# Patient Record
Sex: FEMALE | Race: WHITE
[De-identification: names, ages, dates, MRNs, and addresses within clinical notes are randomized per-mention and may not be internally consistent; named-entity substitution may affect disease eponyms.]

---

## 2017-05-06 ENCOUNTER — HOSPITAL ENCOUNTER (EMERGENCY)
Dept: HOSPITAL 10 - FTE | Age: 47
Discharge: HOME | End: 2017-05-06
Payer: MEDICAID

## 2017-05-06 VITALS
BODY MASS INDEX: 23.45 KG/M2 | HEIGHT: 62 IN | HEIGHT: 62 IN | WEIGHT: 127.43 LBS | BODY MASS INDEX: 23.45 KG/M2 | WEIGHT: 127.43 LBS

## 2017-05-06 DIAGNOSIS — J02.9: Primary | ICD-10-CM

## 2017-05-06 PROCEDURE — 96372 THER/PROPH/DIAG INJ SC/IM: CPT

## 2017-05-06 NOTE — ERD
ER Documentation


Chief Complaint


Date/Time


DATE: 5/6/17 


TIME: 16:38


Chief Complaint


ST,EAR ACHES





HPI


This a 46-year-old female complains of sore throat on the left side for the 

past 4 days with some subjective fever for 2 days.  She has no headache she 

does have some referred pain into the ear.  The pain is described as constant 

dull.  She has no vomiting no neck pain no stiff neck no photophobia no 

vomiting diarrhea abdominal pain no difficulty breathing or swallowing





ROS


All systems reviewed and are negative except as per history of present illness.





Medications


Home Meds


Active Scripts


Azithromycin* (Zithromax*) 250 Mg Tablet, 250 MG PO .ZPACK AS DIRECTED, #6 TAB


   TAKE 500 MG (2 TABS) THE FIRST DAY THEN 250 MG (1 TAB) DAYS 2-5


   Prov:CLEOPATRA HASKINS DO         5/6/17


Hydrocodone/Acetaminophen (Norco  Tablet) 1 Each Tablet, 1 TAB PO Q6H Y 

for PAIN, #20 TAB


   Prov:CLEOPATRA HASKINS DO         5/6/17


Ibuprofen* (Motrin*) 800 Mg Tab, 800 MG PO Q6H Y for PAIN AND OR ELEVATED TEMP, 

#30 TAB


   Prov:CLEOPATRA HASKINS DO         5/6/17


Reported Medications


Nortriptyline Hcl* (Nortriptyline Hcl*) 10 Mg Capsule


   1/30/11


Acetaminophen (Tylenol 8 Hour) 650 Mg Tablet.sa


   3/27/10





Allergies


Allergies:  


Uncoded Allergies:  


     [id number] (SULFA (SULFONAMIDE ANTIBIOTICS)) (Allergy, Mild, rash, 3/27/10)





PMhx/Soc


Medical and Surgical Hx:  pt denies Medical Hx, pt denies Surgical Hx


History of Surgery:  No


Anesthesia Reaction:  No


Hx Neurological Disorder:  No


Hx Respiratory Disorders:  No


Hx Cardiac Disorders:  No


Hx Psychiatric Problems:  No


Hx Miscellaneous Medical Probl:  No


Hx Alcohol Use:  No


Hx Substance Use:  No


Hx Tobacco Use:  No





FmHx


Family History:  No coronary disease





Physical Exam


Vitals





Vital Signs








  Date Time  Temp Pulse Resp B/P Pulse Ox O2 Delivery O2 Flow Rate FiO2


 


5/6/17 14:45 98.8 83 14 114/59 100   








Physical Exam


Const:      Well-developed, well-nourished


 Head:        Atraumatic, normocephalic


 Eyes:       Normal Conjunctiva, PERRLA, EOMI, normal sclera, no nystagmus


 ENT:         Normal External Ears, Nose and Mouth, moist mucus membranes, the 

left tonsil is inflamed and red with exudate around the tonsil and tonsillar 

pillars there is no uvula malalignments, voice is clear.


 Neck:        Full range of motion.  No meningismus, no lymphadenopathy.


 Resp:         Clear to auscultation bilaterally, no wheezing, rhonchi, rales


 Cardio:       Regular rate and rhythm, no murmurs, S1 S2 present


 Abd:         Soft, non tender x 4, non distended. Normal bowel sounds, no 

guarding or rebound, no pulsitile abdominal masses or bruits


 Skin:         No petechiae or rashes, no ecchymosis , no maculopapular rash


 Back:        No midline or flank tenderness


 Ext:          No cyanosis, or edema, FROM x 4, normal inspection, 

neurovascularly intact x 4


 Neur:        Awake and alert, STR 5/5 x 4, sensation intact x 4, no focal 

findings, cerebellum intact


 Psych:        Normal Mood and Affect


Results 24 hrs





 Current Medications








 Medications


  (Trade)  Dose


 Ordered  Sig/Harriet


 Route


 PRN Reason  Start Time


 Stop Time Status Last Admin


Dose Admin


 


 Penicillin G


 Benzathine


  (Bicillin La)  2,400,000


 units  ONCE  ONCE


 IM


   5/6/17 16:30


 5/6/17 16:31 DC  


 


 


 Acetaminophen/


 Hydrocodone Bitart


  (Lortab Liq)  15 ml  ONCE  ONCE


 PO


   5/6/17 16:30


 5/6/17 16:31 DC  


 











Procedures/MDM


Received Bicillin LA 2.4 million units





Departure


Diagnosis:  


 Primary Impression:  


 Pharyngitis


 Pharyngitis/tonsillitis etiology:  unspecified etiology  Qualified Code:  

J02.9 - Pharyngitis, unspecified etiology


Condition:  Stable


Patient Instructions:  Pharyngitis, Strep (Presumed)











CLEOPATRA HASKINS DO May 6, 2017 16:40

## 2017-07-31 ENCOUNTER — HOSPITAL ENCOUNTER (EMERGENCY)
Dept: HOSPITAL 10 - FTE | Age: 47
Discharge: HOME | End: 2017-07-31
Payer: COMMERCIAL

## 2017-07-31 VITALS — BODY MASS INDEX: 23.21 KG/M2 | HEIGHT: 55 IN | WEIGHT: 100.31 LBS

## 2017-07-31 DIAGNOSIS — R10.2: Primary | ICD-10-CM

## 2017-07-31 DIAGNOSIS — R39.15: ICD-10-CM

## 2017-07-31 PROCEDURE — 76856 US EXAM PELVIC COMPLETE: CPT

## 2017-07-31 PROCEDURE — 76830 TRANSVAGINAL US NON-OB: CPT

## 2017-07-31 PROCEDURE — 81003 URINALYSIS AUTO W/O SCOPE: CPT

## 2017-07-31 NOTE — RADRPT
PROCEDURE:   US Pelvis 

 

CLINICAL INDICATION:   Pelvic pain.

 

TECHNIQUE:   Transabdominal and transvaginal sonographic evaluation of the pelvis was performed. 

 

COMPARISON:   None. 

 

FINDINGS:

Myometrium is heterogeneous in echotexture without fibroids.  

 

Endometrium is normal in thickness without a focal abnormality.  A Nabothian cyst is noted within th
e cervix

 

Left ovary is not visualized.  There is a septated 2.5 cm cyst in the left ovary.

 

No free pelvic fluid.  

 

MEASUREMENTS:

Uterus:  7.8 x 5.8 x 4.6 cm, anteverted.  

Endometrium: 0.9 cm. 

Right ovary size: 3.9 x 1.9 x 2.7 cm

 

IMPRESSION:

1.  Nonvisualization of the left ovary.  Septated cyst in the right ovary measuring up to 2.5 cm wit
hout worrisome adnexal mass.

2.  Otherwise, unremarkable examination.

 

 

RPTAT: EE

_____________________________________________ 

.Kenneth Galloway MD, MD           Date    Time 

Electronically viewed and signed by .Kenneth Galloway MD, MD on 07/31/2017 14:38 

 

D:  07/31/2017 14:38  T:  07/31/2017 14:38

.C/

## 2017-07-31 NOTE — ERD
ER Documentation


Chief Complaint


Date/Time


DATE: 17 


TIME: 14:04


Chief Complaint


ABD FEELS BLOATED





HPI


This is a 47-year-old female who presents the emergency department today 

complaining of some urinary urgency and something pushing down in her vagina.  

Denies any pain with urination, fevers or chills, vaginal bleeding.





ROS


All systems reviewed and are negative except as per history of present illness.





Medications


Home Meds


Active Scripts


Acetaminophen* (Tylophen*) 500 Mg Capsule, 1 CAP PO Q6H Y for PAIN AND OR 

ELEVATED TEMP, #30 CAP


   Prov:KELSEY RODRÍGUEZ PA-C         17


Ibuprofen* (Motrin*) 600 Mg Tab, 600 MG PO Q6, #30 TAB


   Prov:KELSEY RODRÍGUEZ PA-C         17


Azithromycin* (Zithromax*) 250 Mg Tablet, 250 MG PO .ZPACK AS DIRECTED, #6 TAB


   TAKE 500 MG (2 TABS) THE FIRST DAY THEN 250 MG (1 TAB) DAYS 2-5


   Prov:CLEOPATRA HASKINS DO         17


Hydrocodone/Acetaminophen (Norco  Tablet) 1 Each Tablet, 1 TAB PO Q6H Y 

for PAIN, #20 TAB


   Prov:CLEOPATRA HASKINS DO         17


Ibuprofen* (Motrin*) 800 Mg Tab, 800 MG PO Q6H Y for PAIN AND OR ELEVATED TEMP, 

#30 TAB


   Prov:CLEOPATRA HASKINS DO         17


Reported Medications


Nortriptyline Hcl* (Nortriptyline Hcl*) 10 Mg Capsule


   11


Acetaminophen (Tylenol 8 Hour) 650 Mg Tablet.sa


   3/27/10





Allergies


Allergies:  


Uncoded Allergies:  


     [id number] (SULFA (SULFONAMIDE ANTIBIOTICS)) (Allergy, Mild, rash, 3/27/10)





PMhx/Soc


Medical and Surgical Hx:  pt denies Medical Hx, pt denies Surgical Hx


History of Surgery:  No


Anesthesia Reaction:  No


Hx Neurological Disorder:  No


Hx Respiratory Disorders:  No


Hx Cardiac Disorders:  No


Hx Psychiatric Problems:  No


Hx Miscellaneous Medical Probl:  No


Hx Alcohol Use:  No


Hx Substance Use:  No


Hx Tobacco Use:  No





Physical Exam


Vitals





Vital Signs








  Date Time  Temp Pulse Resp B/P Pulse Ox O2 Delivery O2 Flow Rate FiO2


 


17 12:03 97.8 78 20 116/76 100   








Physical Exam


Const:     NAD


Head:   Atraumatic 


Eyes:    Normal Conjunctiva


ENT:    Normal External Ears, Nose and Mouth.


Neck:               Full range of motion..~ No meningismus.


Resp:    Clear to auscultation bilaterally


Cardio:    Regular rate and rhythm, no murmurs


Abd:    Soft, mild pelvic tenderness non distended. Normal bowel sounds.  No 

tenderness McBurney


:   Pelvic exam with no vaginal bleeding, discharge.  No cervical motion 

tenderness


Skin:    No petechiae or rashes


Back:    No midline or flank tenderness


Ext:    No cyanosis, or edema


Neur:    Awake and alert


Psych:    Normal Mood and Affect


Results 24 hrs





 Laboratory Tests








Test


  17


13:12


 


Bedside Urine pH (LAB) 5.5 


 


Bedside Urine Protein (LAB) Negative 


 


Bedside Urine Glucose (UA) Negative 


 


Bedside Urine Ketones (LAB) Negative 


 


Bedside Urine Blood Trace-intact 


 


Bedside Urine Nitrite (LAB) Negative 


 


Bedside Urine Leukocyte


Esterase (L Negative 


 





DIAGNOSTIC IMAGING REPORT





 Patient: EFFIE SWIFT   : 1970   Age: 47  Sex: F                   

     


 MR #:    [id number]   Acct #:   [id number]    DOS: 17 0000


 Ordering MD: KELSEY RODRÍGUEZ PA-C   Location:  [facility]   Room/Bed:             

                               


 








PROCEDURE:   US Pelvis 


 


CLINICAL INDICATION:   Pelvic pain.


 


TECHNIQUE:   Transabdominal and transvaginal sonographic evaluation of the 

pelvis was performed. 


 


COMPARISON:   None. 


 


FINDINGS:


Myometrium is heterogeneous in echotexture without fibroids.  


 


Endometrium is normal in thickness without a focal abnormality.  A Nabothian 

cyst is noted within the cervix


 


Left ovary is not visualized.  There is a septated 2.5 cm cyst in the left 

ovary.


 


No free pelvic fluid.  


 


MEASUREMENTS:


Uterus:  7.8 x 5.8 x 4.6 cm, anteverted.  


Endometrium: 0.9 cm. 


Right ovary size: 3.9 x 1.9 x 2.7 cm


 


IMPRESSION:


1.  Nonvisualization of the left ovary.  Septated cyst in the right ovary 

measuring up to 2.5 cm without worrisome adnexal mass.


2.  Otherwise, unremarkable examination.


 


 


RPTAT: EE


_____________________________________________ 


.Kenneth Galloway MD, MD           Date    Time 


Electronically viewed and signed by .Kenneth Galloway MD, MD on 2017 14:38 


 


D:  2017 14:38  T:  2017 14:38


.C/





CC: KELSEY RODRÍGUEZ PA-C





Procedures/MDM


This 39-year-old female who presents the emergency department today complaining 

of some urinary frequency and feeling when she goes that she has to continue 

urinating.  She complained of something pushing down in her vagina.  I did do a 

UA and ultrasound





UA is negative for infection.


Pregnancy test is negative


Ultrasound pelvis shows nonvisualization of the left ovary.  There is a 

septated cyst in the right ovary measuring up to 2.5 cm without worrisome 

adnexal mass.  A nabothian cyst is noted within the cervix.  Otherwise 

unremarkable





Patient had very mild pelvic tenderness and of low suspicion for ectopic 

pregnancy, tubal ovarian abscess, ovarian torsion.





Pelvic exam showed no abnormalities.  There is no evidence of bladder prolapse 

at this time.  There is no evidence of cervical motion tenderness.  Low 

suspicion for STI





Patient has some pelvic discomfort of uncertain etiology at this time in 

addition to urinary urgency.  Patient indicated she had not seen a gynecologist 

in a long time.  I explained her that she should get regular follow-up 

especially given her age.  Patient understood.  I did not feel that the patient 

required imaging at this time and she is afebrile and otherwise well-appearing.

  Low suspicion for acute surgical abdomen.  She has no tenderness McBurney's 

peer





Patient s given a prescription for Tylenol and Motrin for home.





At this time the patient is stable for discharge and outpatient management. 

Patient should follow up with their PCP in the next 1-2 days.  They may return 

to the emergency department sooner for any persistent or worsening of symptoms.

  Patient understood and agreed with the plan.





Departure


Diagnosis:  


 Primary Impression:  


 Pelvic pain


 Additional Impression:  


 Urinary urgency


Condition:  Fair











KELSEY RODRÍGUEZ PA-C 2017 14:04

## 2017-11-25 ENCOUNTER — HOSPITAL ENCOUNTER (EMERGENCY)
Dept: HOSPITAL 10 - FTE | Age: 47
Discharge: HOME | End: 2017-11-25
Payer: COMMERCIAL

## 2017-11-25 VITALS
WEIGHT: 101.85 LBS | HEIGHT: 60 IN | WEIGHT: 101.85 LBS | BODY MASS INDEX: 20 KG/M2 | HEIGHT: 60 IN | BODY MASS INDEX: 20 KG/M2

## 2017-11-25 DIAGNOSIS — F17.210: ICD-10-CM

## 2017-11-25 DIAGNOSIS — K29.70: Primary | ICD-10-CM

## 2017-11-25 DIAGNOSIS — E03.9: ICD-10-CM

## 2017-11-25 LAB
ADD UMIC: YES
ALBUMIN SERPL-MCNC: 4.3 G/DL (ref 3.3–4.9)
ALBUMIN/GLOB SERPL: 1.43 {RATIO}
ALP SERPL-CCNC: 72 IU/L (ref 42–121)
ALT SERPL-CCNC: 29 IU/L (ref 13–69)
AMYLASE SERPL-CCNC: 98 U/L (ref 11–123)
ANION GAP SERPL CALC-SCNC: 15 MMOL/L (ref 8–16)
APTT BLD: 26.9 SEC (ref 25–35)
AST SERPL-CCNC: 21 IU/L (ref 15–46)
BASOPHILS # BLD AUTO: 0.1 10^3/UL (ref 0–0.1)
BASOPHILS NFR BLD: 0.5 % (ref 0–2)
BILIRUB DIRECT SERPL-MCNC: 0 MG/DL (ref 0–0.2)
BILIRUB SERPL-MCNC: 0.3 MG/DL (ref 0.2–1.3)
BUN SERPL-MCNC: 12 MG/DL (ref 7–20)
CALCIUM SERPL-MCNC: 9.4 MG/DL (ref 8.4–10.2)
CHLORIDE SERPL-SCNC: 105 MMOL/L (ref 97–110)
CO2 SERPL-SCNC: 25 MMOL/L (ref 21–31)
COLOR UR: (no result)
CREAT SERPL-MCNC: 0.61 MG/DL (ref 0.44–1)
EOSINOPHIL # BLD: 0.2 10^3/UL (ref 0–0.5)
EOSINOPHIL NFR BLD: 1.5 % (ref 0–7)
ERYTHROCYTE [DISTWIDTH] IN BLOOD BY AUTOMATED COUNT: 12.3 % (ref 11.5–14.5)
GLOBULIN SER-MCNC: 3 G/DL (ref 1.3–3.2)
GLUCOSE SERPL-MCNC: 84 MG/DL (ref 70–220)
GLUCOSE UR STRIP-MCNC: NEGATIVE MG/DL
HCT VFR BLD CALC: 33.8 % (ref 37–47)
HGB BLD-MCNC: 11.5 G/DL (ref 12–16)
INR PPP: 0.97
KETONES UR STRIP.AUTO-MCNC: (no result) MG/DL
LYMPHOCYTES # BLD AUTO: 1.9 10^3/UL (ref 0.8–2.9)
LYMPHOCYTES NFR BLD AUTO: 19 % (ref 15–51)
MCH RBC QN AUTO: 30.5 PG (ref 29–33)
MCHC RBC AUTO-ENTMCNC: 34 G/DL (ref 32–37)
MCV RBC AUTO: 89.7 FL (ref 82–101)
MONOCYTES # BLD: 0.6 10^3/UL (ref 0.3–0.9)
MONOCYTES NFR BLD: 6.1 % (ref 0–11)
NEUTROPHILS # BLD: 7.3 10^3/UL (ref 1.6–7.5)
NEUTROPHILS NFR BLD AUTO: 72.7 % (ref 39–77)
NITRITE UR QL STRIP.AUTO: NEGATIVE MG/DL
NRBC # BLD MANUAL: 0 10^3/UL (ref 0–0)
NRBC BLD AUTO-RTO: 0 /100WBC (ref 0–0)
PLATELET # BLD: 203 10^3/UL (ref 140–415)
PMV BLD AUTO: 10.9 FL (ref 7.4–10.4)
POTASSIUM SERPL-SCNC: 3.5 MMOL/L (ref 3.5–5.1)
PROT SERPL-MCNC: 7.3 G/DL (ref 6.1–8.1)
PROTHROMBIN TIME: 12.9 SEC (ref 12.2–14.2)
PT RATIO: 1
RBC # BLD AUTO: 3.77 10^6/UL (ref 4.2–5.4)
RBC # UR AUTO: (no result) MG/DL
SODIUM SERPL-SCNC: 141 MMOL/L (ref 135–144)
UR ASCORBIC ACID: NEGATIVE MG/DL
UR BILIRUBIN (DIP): NEGATIVE MG/DL
UR CLARITY: CLEAR
UR PH (DIP): 5 (ref 5–9)
UR RBC: 0 /HPF (ref 0–5)
UR SPECIFIC GRAVITY (DIP): 1 (ref 1–1.03)
UR TOTAL PROTEIN (DIP): NEGATIVE MG/DL
UROBILINOGEN UR STRIP-ACNC: NEGATIVE MG/DL
WBC # BLD AUTO: 10.1 10^3/UL (ref 4.8–10.8)
WBC # UR STRIP: NEGATIVE LEU/UL

## 2017-11-25 PROCEDURE — 85025 COMPLETE CBC W/AUTO DIFF WBC: CPT

## 2017-11-25 PROCEDURE — 81001 URINALYSIS AUTO W/SCOPE: CPT

## 2017-11-25 PROCEDURE — 83690 ASSAY OF LIPASE: CPT

## 2017-11-25 PROCEDURE — 84484 ASSAY OF TROPONIN QUANT: CPT

## 2017-11-25 PROCEDURE — 93005 ELECTROCARDIOGRAM TRACING: CPT

## 2017-11-25 PROCEDURE — 84703 CHORIONIC GONADOTROPIN ASSAY: CPT

## 2017-11-25 PROCEDURE — 85610 PROTHROMBIN TIME: CPT

## 2017-11-25 PROCEDURE — 85730 THROMBOPLASTIN TIME PARTIAL: CPT

## 2017-11-25 PROCEDURE — 76705 ECHO EXAM OF ABDOMEN: CPT

## 2017-11-25 PROCEDURE — 80053 COMPREHEN METABOLIC PANEL: CPT

## 2017-11-25 PROCEDURE — 82150 ASSAY OF AMYLASE: CPT

## 2017-11-25 NOTE — RADRPT
PROCEDURE:  US right upper quadrant abdomen

 

CLINICAL INDICATION:  Right upper quadrant tenderness for four hours 

 

TECHNIQUE:  Multiple real-time images were acquired of the abdomen right upper quadrant

 

COMPARISON:  Abdomen/pelvis unenhanced CT 05/02/2014

 

FINDINGS:

 

The liver is slightly small in length, not significantly changed when compared to the May 2014 CT sc
an.  Liver echogenicity is unremarkable.  The main portal vein is patent with hepatopetal blood flow
.

 

The gallbladder is unremarkable.  There is no abnormal pericholecystic fluid or gallbladder wall thi
ckening. The Souza's sign was negative.  No intrahepatic or extrahepatic bile duct dilation is seen
. The common bile duct measures 2.3 mm in maximal dimension.

 

Portions of the pancreas are obscured by overlying bowel gas; the visualized portions of the pancrea
s are unremarkable.

 

The right kidney measures 10.4 cm in length.  The right kidney is unremarkable. No abnormal perirena
l fluid collections seen.

 

No right upper quadrant ascites or right hydronephrosis seen.

 

IMPRESSION:

 

1.  The gallbladder is unremarkable

 

 

 

RPTAT: TT

_____________________________________________ 

Physician Tam           Date    Time 

Electronically viewed and signed by Physician Tam on 11/25/2017 17:53 

 

D:  11/25/2017 17:53  T:  11/25/2017 17:53

MARIANA/

## 2017-11-25 NOTE — ERD
ER Documentation


Chief Complaint


Chief Complaint


upper epigastric pain with nausea x today





HPI


47-year-old female who presents emergency department for epigastric pain and 

nausea without vomiting for 1 day.  She also complains of left sided chest 

discomfort.  Denies headache, dizziness, blurred vision, neck pain, difficulty 

swallowing, loss of appetite, shoulder pain, back pain, vomiting, constipation, 

diarrhea, urinary symptoms, loss of bowel and bladder control, changes in bowel 

and bladder habits, pregnancy or possibility of being pregnant, difficulty 

walking, numbness or tingling sensation, recent exposure to any illness, recent 

antibiotic use in the last 3 months, fever, chills.





LMP: She is on it.   A0.





ROS


All systems reviewed and are negative except as per history of present illness.





Medications


Home Meds


Active Scripts


Famotidine* (Pepcid*) 20 Mg Tablet, 20 MG PO BID for 30 Days, #60 TAB


   Prov:GOMEZ HERRING         17


Ondansetron Hcl* (Zofran*) 4 Mg Tablet, 4 MG PO Q8 Y for nausea/vomiting, #30 

TAB


   Prov:GOMEZ HERRING         17


Acetaminophen* (Tylophen*) 500 Mg Capsule, 1 CAP PO Q6H Y for PAIN AND OR 

ELEVATED TEMP, #30 CAP


   Prov:KELSEY RODRÍGUEZ PA-C         17


Ibuprofen* (Motrin*) 600 Mg Tab, 600 MG PO Q6, #30 TAB


   Prov:KELSEY RODRÍGUEZ PA-C         17


Azithromycin* (Zithromax*) 250 Mg Tablet, 250 MG PO .ZPACK AS DIRECTED, #6 TAB


   TAKE 500 MG (2 TABS) THE FIRST DAY THEN 250 MG (1 TAB) DAYS 2-5


   Prov:CLEOPATRA HASKINS DO         17


Hydrocodone/Acetaminophen (Norco  Tablet) 1 Each Tablet, 1 TAB PO Q6H Y 

for PAIN, #20 TAB


   Prov:CLEOPATRA HASKINS DO         17


Ibuprofen* (Motrin*) 800 Mg Tab, 800 MG PO Q6H Y for PAIN AND OR ELEVATED TEMP, 

#30 TAB


   Prov:CLEOPATRA HASKINS DO         17


Reported Medications


Nortriptyline Hcl* (Nortriptyline Hcl*) 10 Mg Capsule


   11


Acetaminophen (Tylenol 8 Hour) 650 Mg Tablet.sa


   3/27/10





Allergies


Allergies:  


Uncoded Allergies:  


     [id number] (SULFA (SULFONAMIDE ANTIBIOTICS)) (Allergy, Mild, rash, 3/27/10)





PMhx/Soc


History of Surgery:  No


Anesthesia Reaction:  No


Hx Neurological Disorder:  No


Hx Respiratory Disorders:  No


Hx Cardiac Disorders:  No


Hx Psychiatric Problems:  No


Hx Miscellaneous Medical Probl:  Yes (hypothyroid)


Hx Alcohol Use:  No


Hx Substance Use:  No


Hx Tobacco Use:  Yes


Smoking Status:  Current every day smoker





Physical Exam


Vitals





Vital Signs








  Date Time  Temp Pulse Resp B/P Pulse Ox O2 Delivery O2 Flow Rate FiO2


 


17 15:47 98.9 63 18 115/62 99   








Physical Exam


Const: Mild distress due to her pain.


Head:   Atraumatic 


Eyes:    Normal Conjunctiva


ENT:    Normal External Ears, Nose and Mouth.


Neck:               Full range of motion..~ No meningismus.


Resp:    Clear to auscultation bilaterally


Cardio:    Regular rate and rhythm, no murmurs


Abd:    Soft, non distended. Normal bowel sounds.  Has right upper abdominal 

tenderness on light and deep palpation during inspiration.  Negative on Rovsing'

s sign.  Negative on Markle sign.  Negative on psoas sign.  No CVA tenderness. 


Skin:    No petechiae or rashes


Back:    No midline or flank tenderness


Ext:    No cyanosis, or edema


Neur:    Awake and alert


Psych:    Normal Mood and Affect


Result Diagram:  


17 1635                                                                  

              17 1635





Results 24 hrs





 Laboratory Tests








Test


  17


16:35 17


16:40 17


17:30


 


White Blood Count 10.110^3/ul   


 


Red Blood Count 3.7710^6/ul   


 


Hemoglobin 11.5g/dl   


 


Hematocrit 33.8%   


 


Mean Corpuscular Volume 89.7fl   


 


Mean Corpuscular Hemoglobin 30.5pg   


 


Mean Corpuscular Hemoglobin


Concent 34.0g/dl 


  


  


 


 


Red Cell Distribution Width 12.3%   


 


Platelet Count 73792^3/UL   


 


Mean Platelet Volume 10.9fl   


 


Neutrophils % 72.7%   


 


Lymphocytes % 19.0%   


 


Monocytes % 6.1%   


 


Eosinophils % 1.5%   


 


Basophils % 0.5%   


 


Nucleated Red Blood Cells % 0.0/100WBC   


 


Neutrophils # 7.310^3/ul   


 


Lymphocytes # 1.910^3/ul   


 


Monocytes # 0.610^3/ul   


 


Eosinophils # 0.210^3/ul   


 


Basophils # 0.110^3/ul   


 


Nucleated Red Blood Cells # 0.010^3/ul   


 


Prothrombin Time 12.9Sec   


 


Prothrombin Time Ratio 1.0   


 


INR International Normalized


Ratio 0.97 


  


  


 


 


Activated Partial


Thromboplast Time 26.9Sec 


  


  


 


 


Sodium Level 141mmol/L   


 


Potassium Level 3.5mmol/L   


 


Chloride Level 105mmol/L   


 


Carbon Dioxide Level 25mmol/L   


 


Anion Gap 15   


 


Blood Urea Nitrogen 12mg/dl   


 


Creatinine 0.61mg/dl   


 


Glucose Level 84mg/dl   


 


Calcium Level 9.4mg/dl   


 


Total Bilirubin 0.3mg/dl   


 


Direct Bilirubin 0.00mg/dl   


 


Indirect Bilirubin 0.3mg/dl   


 


Aspartate Amino Transf


(AST/SGOT) 21IU/L 


  


  


 


 


Alanine Aminotransferase


(ALT/SGPT) 29IU/L 


  


  


 


 


Alkaline Phosphatase 72IU/L   


 


Troponin I < 0.012ng/ml   


 


Total Protein 7.3g/dl   


 


Albumin 4.3g/dl   


 


Globulin 3.00g/dl   


 


Albumin/Globulin Ratio 1.43   


 


Amylase Level 98U/L   


 


Lipase 92U/L   


 


Serum HCG, Qualitative  NEGATIVE  


 


Urine Color   STRAW 


 


Urine Clarity   CLEAR 


 


Urine pH   5.0 


 


Urine Specific Gravity   1.005 


 


Urine Ketones   TRACEmg/dL 


 


Urine Nitrite   NEGATIVEmg/dL 


 


Urine Bilirubin   NEGATIVEmg/dL 


 


Urine Urobilinogen   NEGATIVEmg/dL 


 


Urine Leukocyte Esterase   NEGATIVELeu/ul 


 


Urine Microscopic RBC   0/HPF 


 


Urine Microscopic WBC   0/HPF 


 


Urine Hemoglobin   1+mg/dL 


 


Urine Glucose   NEGATIVEmg/dL 


 


Urine Total Protein   NEGATIVEmg/dl 








 Current Medications








 Medications


  (Trade)  Dose


 Ordered  Sig/Harriet


 Route


 PRN Reason  Start Time


 Stop Time Status Last Admin


Dose Admin


 


 Ondansetron HCl


  (Zofran Odt)  4 mg  ONCE  STAT


 ODT


   17 16:46


 17 16:47 DC 17 16:54


 


 


 Miscellaneous


 Medication


  (Gi Cocktail (2))  40 ml  ONCE  ONCE


 PO


   17 17:00


 17 17:01 DC 17 16:54


 











Procedures/MDM


EKG: Sinus rhythm with short NJ with a ventricular rate of 60 bpm.  Otherwise 

normal ECG.  No STEMI.  Read by supervising emergency room physician, Dr. Odilon Rodrigues.





Ultrasound of the gallbladder


The gallbladder is unremarkable.  The right kidney measures 10.4 cm in length.  

The right kidney is unremarkable.  No abnormal perirenal fluid collection is 

seen.  No right upper quadrant ascites or right hydronephrosis seen.





Blood works: Reviewed.


HCG qualitative: Negative.


Urinalysis: Reviewed. 





Treatment: Zofran and GI cocktail p.o.





Reevaluation: Stated that she was relieved by Zofran and GI cocktail.  No 

abdominal tenderness at this time.  No episode of emesis here in the emergency 

department.  No CVA tenderness.





Differential diagnosis includes but not limited to acute myocardial infarction 

versus abdominal aortic aneurysm versus acute cholecystitis versus pancreatitis 

versus diverticulitis versus appendicitis versus diverticulosis versus 

nephrolithiasis versus pyelonephritis versus gastritis versus gastroesophageal 

reflux disease 





I have low suspicion to acute myocardial infarction, abdominal aortic aneurysm, 

cholecystitis, pancreatitis, diverticulitis, appendicitis due to the following 

reasons: EKG revealed no STEMI; lipase is within normal limits; description of 

her pain is not typical abdominal aortic aneurysm presentation.





Final diagnosis: Gastritis





Prescription: Pepcid.  Zofran.





Follow-up with PCP in the next 24-48 hours.  Come back here in the emergency 

department for any new symptoms or any worsening of symptoms.  All questions 

and concerns are answered.  Patient and family member verbalized understanding.





Hemodynamically stable on discharge.





Departure


Diagnosis:  


 Primary Impression:  


 Gastritis


Condition:  Stable





Additional Instructions:  


Follow-up with PCP in the next 24-48 hours.  Come back here in the emergency 

department for any new symptoms or any worsening of symptoms.  All questions 

and concerns are answered.  Patient and family member verbalized understanding.











GOMEZ HERRING 2017 16:49

## 2018-11-19 ENCOUNTER — HOSPITAL ENCOUNTER (EMERGENCY)
Dept: HOSPITAL 91 - FTE | Age: 48
Discharge: HOME | End: 2018-11-19
Payer: COMMERCIAL

## 2018-11-19 ENCOUNTER — HOSPITAL ENCOUNTER (EMERGENCY)
Age: 48
Discharge: HOME | End: 2018-11-19

## 2018-11-19 DIAGNOSIS — J03.90: Primary | ICD-10-CM

## 2018-11-19 DIAGNOSIS — Z87.891: ICD-10-CM

## 2018-11-19 PROCEDURE — 99283 EMERGENCY DEPT VISIT LOW MDM: CPT

## 2019-06-27 ENCOUNTER — HOSPITAL ENCOUNTER (EMERGENCY)
Dept: HOSPITAL 10 - FTE | Age: 49
Discharge: HOME | End: 2019-06-27
Payer: COMMERCIAL

## 2019-06-27 ENCOUNTER — HOSPITAL ENCOUNTER (EMERGENCY)
Dept: HOSPITAL 91 - FTE | Age: 49
Discharge: HOME | End: 2019-06-27
Payer: COMMERCIAL

## 2019-06-27 VITALS — DIASTOLIC BLOOD PRESSURE: 78 MMHG | SYSTOLIC BLOOD PRESSURE: 123 MMHG | HEART RATE: 95 BPM | RESPIRATION RATE: 16 BRPM

## 2019-06-27 VITALS
HEIGHT: 63 IN | BODY MASS INDEX: 18.83 KG/M2 | HEIGHT: 63 IN | BODY MASS INDEX: 18.83 KG/M2 | WEIGHT: 106.26 LBS | WEIGHT: 106.26 LBS

## 2019-06-27 DIAGNOSIS — J00: ICD-10-CM

## 2019-06-27 DIAGNOSIS — M79.604: ICD-10-CM

## 2019-06-27 DIAGNOSIS — M79.605: ICD-10-CM

## 2019-06-27 DIAGNOSIS — M54.9: Primary | ICD-10-CM

## 2019-06-27 DIAGNOSIS — M79.641: ICD-10-CM

## 2019-06-27 DIAGNOSIS — M79.642: ICD-10-CM

## 2019-06-27 DIAGNOSIS — F17.210: ICD-10-CM

## 2019-06-27 LAB
ADD MAN DIFF?: NO
ADD UMIC: YES
ALANINE AMINOTRANSFERASE: 25 IU/L (ref 13–69)
ALBUMIN/GLOBULIN RATIO: 1.21
ALBUMIN: 4.5 G/DL (ref 3.3–4.9)
ALKALINE PHOSPHATASE: 103 IU/L (ref 42–121)
ANION GAP: 11 (ref 5–13)
ASPARTATE AMINO TRANSFERASE: 25 IU/L (ref 15–46)
BASOPHIL #: 0 10^3/UL (ref 0–0.1)
BASOPHILS %: 0.3 % (ref 0–2)
BILIRUBIN,DIRECT: 0 MG/DL (ref 0–0.2)
BILIRUBIN,TOTAL: 0.4 MG/DL (ref 0.2–1.3)
BLOOD UREA NITROGEN: 9 MG/DL (ref 7–20)
CALCIUM: 9.8 MG/DL (ref 8.4–10.2)
CARBON DIOXIDE: 27 MMOL/L (ref 21–31)
CHLORIDE: 105 MMOL/L (ref 97–110)
CREATINE KINASE: 39 IU/L (ref 23–200)
CREATININE: 0.54 MG/DL (ref 0.44–1)
EOSINOPHILS #: 0.1 10^3/UL (ref 0–0.5)
EOSINOPHILS %: 1.2 % (ref 0–7)
GLOBULIN: 3.7 G/DL (ref 1.3–3.2)
GLUCOSE: 91 MG/DL (ref 70–220)
HEMATOCRIT: 38.6 % (ref 37–47)
HEMOGLOBIN: 12.8 G/DL (ref 12–16)
IMMATURE GRANS #M: 0.03 10^3/UL (ref 0–0.03)
IMMATURE GRANS % (M): 0.3 % (ref 0–0.43)
LYMPHOCYTES #: 1.6 10^3/UL (ref 0.8–2.9)
LYMPHOCYTES %: 13.4 % (ref 15–51)
MEAN CORPUSCULAR HEMOGLOBIN: 29.7 PG (ref 29–33)
MEAN CORPUSCULAR HGB CONC: 33.2 G/DL (ref 32–37)
MEAN CORPUSCULAR VOLUME: 89.6 FL (ref 82–101)
MEAN PLATELET VOLUME: 10.6 FL (ref 7.4–10.4)
MONOCYTE #: 1 10^3/UL (ref 0.3–0.9)
MONOCYTES %: 8.1 % (ref 0–11)
NEUTROPHIL #: 9.1 10^3/UL (ref 1.6–7.5)
NEUTROPHILS %: 76.7 % (ref 39–77)
NUCLEATED RED BLOOD CELLS #: 0 10^3/UL (ref 0–0)
NUCLEATED RED BLOOD CELLS%: 0 /100WBC (ref 0–0)
PLATELET COUNT: 218 10^3/UL (ref 140–415)
POTASSIUM: 4.5 MMOL/L (ref 3.5–5.1)
RED BLOOD COUNT: 4.31 10^6/UL (ref 4.2–5.4)
RED CELL DISTRIBUTION WIDTH: 12.2 % (ref 11.5–14.5)
SODIUM: 143 MMOL/L (ref 135–144)
THYROID STIMULATING HORMONE: < 0.015 MIU/L (ref 0.47–4.68)
TOTAL PROTEIN: 8.2 G/DL (ref 6.1–8.1)
UR ASCORBIC ACID: NEGATIVE MG/DL
UR BACTERIA: (no result) /HPF
UR BILIRUBIN (DIP): NEGATIVE MG/DL
UR BLOOD (DIP): (no result) MG/DL
UR CLARITY: (no result)
UR COLOR: YELLOW
UR GLUCOSE (DIP): NEGATIVE MG/DL
UR KETONES (DIP): (no result) MG/DL
UR LEUKOCYTE ESTERASE (DIP): NEGATIVE LEU/UL
UR NITRITE (DIP): NEGATIVE MG/DL
UR PH (DIP): 5 (ref 5–9)
UR RBC: 0 /HPF (ref 0–5)
UR SPECIFIC GRAVITY (DIP): 1.01 (ref 1–1.03)
UR SQUAMOUS EPITHELIAL CELL: (no result) /HPF
UR TOTAL PROTEIN (DIP): NEGATIVE MG/DL
UR UROBILINOGEN (DIP): NEGATIVE MG/DL
UR WBC: 0 /HPF (ref 0–5)
WHITE BLOOD COUNT: 11.8 10^3/UL (ref 4.8–10.8)

## 2019-06-27 PROCEDURE — 81001 URINALYSIS AUTO W/SCOPE: CPT

## 2019-06-27 PROCEDURE — 85025 COMPLETE CBC W/AUTO DIFF WBC: CPT

## 2019-06-27 PROCEDURE — 84443 ASSAY THYROID STIM HORMONE: CPT

## 2019-06-27 PROCEDURE — 80053 COMPREHEN METABOLIC PANEL: CPT

## 2019-06-27 PROCEDURE — 82550 ASSAY OF CK (CPK): CPT

## 2019-06-27 PROCEDURE — 36415 COLL VENOUS BLD VENIPUNCTURE: CPT

## 2019-06-27 PROCEDURE — 99283 EMERGENCY DEPT VISIT LOW MDM: CPT

## 2019-06-27 NOTE — ERD
ER Documentation


Chief Complaint


Chief Complaint





pain on both upper and lower extremities and back pain





HPI


This is a 49-year-old female patient who presents emergency room with complaint 


of diffuse body pain increasing over the last 2 days.  No fevers, no cough, + 


sore throat, + rhinorrhea, no dysuria.  Patient states that she is being worked 


up by her primary care provider for hypothyroid and hydronephrosis that was 


diagnosed after an ultrasound after patient had abnormal kidney function labs.  


Patient states that she has intermittent sharp pain in right flank.  No recent 


travel, no sick contacts.





ROS


All systems reviewed and are negative except as per history of present illness.





Medications


Home Meds


Active Scripts


Acetaminophen* (Tylophen*) 500 Mg Capsule, 2 CAP PO Q8H PRN for PAIN AND OR 


ELEVATED TEMP, #20 CAP


   Prov:CHRISTA CRUZ NP         6/27/19


Fluticasone Propionate (Flonase Allergy Relief) 9.9 Ml [place].susp, 1 SPRAY NASAL


DAILY for 14 Days, #1 BOTTLE


   TO EACH NOSTRIL


   Prov:CHRISTA CRUZ NP         6/27/19


Ibuprofen* (Motrin*) 400 Mg Tab, 400 MG PO Q8, #15 TAB


   Prov:JORGE L LEE MD         11/19/18


Amoxicillin* (Amoxicillin*) 500 Mg Cap, 500 MG PO TID for 10 Days, CAP


   Prov:JORGE L LEE MD         11/19/18


Famotidine* (Pepcid*) 20 Mg Tablet, 20 MG PO BID for 30 Days, #60 TAB


   Prov:GOMEZ HERRING         11/25/17


Ondansetron Hcl* (Zofran*) 4 Mg Tablet, 4 MG PO Q8 PRN for nausea/vomiting, #30 


TAB


   Prov:GOMEZ HERRING         11/25/17


Acetaminophen* (Tylophen*) 500 Mg Capsule, 1 CAP PO Q6H PRN for PAIN AND OR 


ELEVATED TEMP, #30 CAP


   Prov:KELSEY RODRÍGUEZ PA-C         7/31/17


Ibuprofen* (Motrin*) 600 Mg Tab, 600 MG PO Q6, #30 TAB


   Prov:KELSEY RODRÍGUEZ PA-C         7/31/17


Azithromycin* (Zithromax*) 250 Mg Tablet, 250 MG PO .ZPACK AS DIRECTED, #6 TAB


   TAKE 500 MG (2 TABS) THE FIRST DAY THEN 250 MG (1 TAB) DAYS 2-5


   Prov:CLEOPATRA HASKINS DO         5/6/17


Hydrocodone/Acetaminophen (Norco  Tablet) 1 Each Tablet, 1 TAB PO Q6H PRN 


for PAIN, #20 TAB


   Prov:CLEOPATRA HASKINS DO         5/6/17


Ibuprofen* (Motrin*) 800 Mg Tab, 800 MG PO Q6H PRN for PAIN AND OR ELEVATED 


TEMP, #30 TAB


   Prov:CLEOPATRA HASKINS DO         5/6/17


Reported Medications


Nortriptyline Hcl* (Nortriptyline Hcl*) 10 Mg Capsule


   1/30/11


Acetaminophen (Tylenol 8 Hour) 650 Mg Tablet.sa


   3/27/10





Allergies


Allergies:  


Coded Allergies:  


     Sulfa (Sulfonamide Antibiotics) (Verified  Allergy, Intermediate, 6/27/19)





PMhx/Soc


History of Surgery:  No


Anesthesia Reaction:  No


Hx Neurological Disorder:  No


Hx Respiratory Disorders:  No


Hx Cardiac Disorders:  No


Hx Psychiatric Problems:  No


Hx Miscellaneous Medical Probl:  Yes (hyperthyroid)


Hx Alcohol Use:  Yes (social)


Hx Substance Use:  No


Hx Tobacco Use:  Yes


Smoking Status:  Current every day smoker





FmHx


Family History:  No diabetes, No coronary disease, No other





Physical Exam


Vitals





Vital Signs


  Date      Temp  Pulse  Resp  B/P (MAP)   Pulse Ox  O2          O2 Flow    FiO2


Time                                                 Delivery    Rate


   6/27/19  99.9     95    24      129/68        99


     17:57                           (88)





Physical Exam


Const:   No acute distress


Head:   Atraumatic 


Eyes:    Normal Conjunctiva


ENT:    Normal External Ears, Nose without drainage.  TM clear BL.  Drinks pink,


moist, no lesions, no exudate, no petechiae.


Neck:               Full range of motion. No meningismus.  Lymphadenopathy, no 


thyromegaly.


Resp:   Clear to auscultation bilaterally


Cardio:   Regular rate and rhythm, no murmurs


Abd:    Soft, non tender, non distended. Normal bowel sounds


Skin:   No petechiae or rashes


Back:   No midline or flank tenderness, no CVT


Ext:    No cyanosis, or edema


Neur:   Awake and alert


Psych:    Normal Mood and Affect


Result Diagram:  


6/27/19 1829                                                                    


           6/27/19 1829





Results 24 hrs





Laboratory Tests


              Test
                                  6/27/19
18:29


              White Blood Count                      11.8 10^3/ul


              Red Blood Count                        4.31 10^6/ul


              Hemoglobin                                12.8 g/dl


              Hematocrit                                   38.6 %


              Mean Corpuscular Volume                     89.6 fl


              Mean Corpuscular Hemoglobin                 29.7 pg


              Mean Corpuscular Hemoglobin
Concent      33.2 g/dl 



              Red Cell Distribution Width                  12.2 %


              Platelet Count                          218 10^3/UL


              Mean Platelet Volume                        10.6 fl


              Immature Granulocytes %                     0.300 %


              Neutrophils %                                76.7 %


              Lymphocytes %                                13.4 %


              Monocytes %                                   8.1 %


              Eosinophils %                                 1.2 %


              Basophils %                                   0.3 %


              Nucleated Red Blood Cells %             0.0 /100WBC


              Immature Granulocytes #               0.030 10^3/ul


              Neutrophils #                           9.1 10^3/ul


              Lymphocytes #                           1.6 10^3/ul


              Monocytes #                             1.0 10^3/ul


              Eosinophils #                           0.1 10^3/ul


              Basophils #                             0.0 10^3/ul


              Nucleated Red Blood Cells #             0.0 10^3/ul


              Urine Color                          YELLOW


              Urine Clarity
                       SLIGHTLY
CLOUDY


              Urine pH                                        5.0


              Urine Specific Gravity                        1.013


              Urine Ketones                              2+ mg/dL


              Urine Nitrite                        NEGATIVE mg/dL


              Urine Bilirubin                      NEGATIVE mg/dL


              Urine Urobilinogen                   NEGATIVE mg/dL


              Urine Leukocyte Esterase
            NEGATIVE
Adele/ul


              Urine Microscopic RBC                        0 /HPF


              Urine Microscopic WBC                        0 /HPF


              Urine Squamous Epithelial
Cells      FEW /HPF 



              Urine Bacteria                       FEW /HPF


              Urine Hemoglobin                           1+ mg/dL


              Urine Glucose                        NEGATIVE mg/dL


              Urine Total Protein                  NEGATIVE mg/dl


              Sodium Level                             143 mmol/L


              Potassium Level                          4.5 mmol/L


              Chloride Level                           105 mmol/L


              Carbon Dioxide Level                      27 mmol/L


              Anion Gap                                        11


              Blood Urea Nitrogen                         9 mg/dl


              Creatinine                               0.54 mg/dl


              Est Glomerular Filtrat Rate
mL/min   > 60 mL/min 



              Glucose Level                              91 mg/dl


              Calcium Level                             9.8 mg/dl


              Total Bilirubin                           0.4 mg/dl


              Direct Bilirubin                         0.00 mg/dl


              Indirect Bilirubin                        0.4 mg/dl


              Aspartate Amino Transf
(AST/SGOT)          25 IU/L 



              Alanine Aminotransferase
(ALT/SGPT)        25 IU/L 



              Alkaline Phosphatase                       103 IU/L


              Creatine Kinase                             39 IU/L


              Total Protein                              8.2 g/dl


              Albumin                                    4.5 g/dl


              Globulin                                  3.70 g/dl


              Albumin/Globulin Ratio                         1.21


              Thyroid Stimulating Hormone
(TSH)    Pending 









Procedures/MDM


PROCEDURES/MDM





LAB INTERPRETATION:


No leukocytosis, no anemia no electrolyte disturbance, normal kidney function, 


no hypo-or hyperglycemia, no transaminitis, no nitrites, no leukocytosis, 


hematuria





MDM:


This is a 49-year-old female patient who presents emergency room with complaint 


of generalized body pain.  Although patient states she has "flu" she also 


reports history of kidney dysfunction and thyroid disorder.


Lab results from today do not indicate infection, sepsis, anemia, nephropathy, 


rhabdomyolysis, thyroid storm.





The patient clinically looks well, has near normal work of breathing, normal 


level of alertness that is age appropriate, and normal abdominal exam. There are


none of the following: meningeal signs, worrisome rash, evidence of serious ENT 


infection, respiratory distress, or evidence of serious bacterial infection by 


history and exam at this time.





Patient has been instructed to increase hydration use Tylenol as needed for body


aches, use Flonase for rhinitis.  Patient was instructed to follow-up with her 


primary care provider and bring lab results from today.  Patient was instructed 


on signs and symptoms of worsening of condition and when to seek emergent 


medical treatment.











DISPOSITION and PLAN: 


RX: Tylenol, Flonase


The patient has been discharge home to follow-up with community physician.





Departure


Diagnosis:  


   Primary Impression:  


   Total body pain


   Additional Impression:  


   Rhinitis


   Rhinitis type:  acute  Qualified Codes:  J00 - Acute nasopharyngitis [common 


   cold]


Condition:  Stable











CHRISTA CRUZ NP              Jun 27, 2019 18:25